# Patient Record
Sex: FEMALE | Race: WHITE | Employment: UNEMPLOYED | URBAN - METROPOLITAN AREA
[De-identification: names, ages, dates, MRNs, and addresses within clinical notes are randomized per-mention and may not be internally consistent; named-entity substitution may affect disease eponyms.]

---

## 2017-07-05 ENCOUNTER — TELEPHONE (OUTPATIENT)
Dept: INTERNAL MEDICINE CLINIC | Age: 62
End: 2017-07-05

## 2017-12-28 ENCOUNTER — TELEPHONE (OUTPATIENT)
Dept: INTERNAL MEDICINE CLINIC | Age: 62
End: 2017-12-28

## 2018-02-07 ENCOUNTER — OFFICE VISIT (OUTPATIENT)
Dept: INTERNAL MEDICINE CLINIC | Age: 63
End: 2018-02-07

## 2018-02-07 VITALS
WEIGHT: 90 LBS | SYSTOLIC BLOOD PRESSURE: 112 MMHG | HEART RATE: 64 BPM | DIASTOLIC BLOOD PRESSURE: 74 MMHG | HEIGHT: 64 IN | BODY MASS INDEX: 15.36 KG/M2

## 2018-02-07 DIAGNOSIS — R31.21 ASYMPTOMATIC MICROSCOPIC HEMATURIA: ICD-10-CM

## 2018-02-07 DIAGNOSIS — Z00.00 WELL ADULT EXAM: ICD-10-CM

## 2018-02-07 DIAGNOSIS — M81.0 AGE-RELATED OSTEOPOROSIS WITHOUT CURRENT PATHOLOGICAL FRACTURE: ICD-10-CM

## 2018-02-07 DIAGNOSIS — R05.8 COUGH WITH SPUTUM: Primary | ICD-10-CM

## 2018-02-07 DIAGNOSIS — Z00.00 WELL ADULT HEALTH CHECK: ICD-10-CM

## 2018-02-07 LAB
BILIRUBIN, POC: NORMAL
BLOOD URINE, POC: NORMAL
CLARITY, POC: CLEAR
COLOR, POC: NORMAL
GLUCOSE URINE, POC: NORMAL
KETONES, POC: NORMAL
LEUKOCYTE EST, POC: NORMAL
NITRITE, POC: NORMAL
PH, POC: 5
PROTEIN, POC: NORMAL
SPECIFIC GRAVITY, POC: 1.03
UROBILINOGEN, POC: 1

## 2018-02-07 PROCEDURE — 71046 X-RAY EXAM CHEST 2 VIEWS: CPT | Performed by: INTERNAL MEDICINE

## 2018-02-07 PROCEDURE — 99205 OFFICE O/P NEW HI 60 MIN: CPT | Performed by: INTERNAL MEDICINE

## 2018-02-07 PROCEDURE — 81002 URINALYSIS NONAUTO W/O SCOPE: CPT | Performed by: INTERNAL MEDICINE

## 2018-02-07 PROCEDURE — 93000 ELECTROCARDIOGRAM COMPLETE: CPT | Performed by: INTERNAL MEDICINE

## 2018-02-07 RX ORDER — AMOXICILLIN AND CLAVULANATE POTASSIUM 875; 125 MG/1; MG/1
1 TABLET, FILM COATED ORAL 2 TIMES DAILY
Qty: 20 TABLET | Refills: 0 | Status: SHIPPED | OUTPATIENT
Start: 2018-02-07 | End: 2018-02-17

## 2018-02-07 RX ORDER — MONTELUKAST SODIUM 10 MG/1
10 TABLET ORAL NIGHTLY
Qty: 30 TABLET | Refills: 0 | Status: SHIPPED | OUTPATIENT
Start: 2018-02-07 | End: 2018-03-05 | Stop reason: SDUPTHER

## 2018-02-07 RX ORDER — CITALOPRAM 20 MG/1
20 TABLET ORAL DAILY
COMMUNITY

## 2018-02-07 RX ORDER — TRAZODONE HYDROCHLORIDE 50 MG/1
50 TABLET ORAL NIGHTLY
COMMUNITY

## 2018-02-07 RX ORDER — TRAMADOL HYDROCHLORIDE 50 MG/1
50 TABLET ORAL EVERY 8 HOURS PRN
COMMUNITY

## 2018-02-07 RX ORDER — FLUTICASONE PROPIONATE 220 UG/1
1 AEROSOL, METERED RESPIRATORY (INHALATION) 2 TIMES DAILY
Qty: 1 INHALER | Refills: 5 | Status: SHIPPED | OUTPATIENT
Start: 2018-02-07

## 2018-02-07 RX ORDER — CHOLESTYRAMINE LIGHT 4 G/5.7G
4 POWDER, FOR SUSPENSION ORAL DAILY
COMMUNITY

## 2018-02-07 ASSESSMENT — PATIENT HEALTH QUESTIONNAIRE - PHQ9
1. LITTLE INTEREST OR PLEASURE IN DOING THINGS: 1
SUM OF ALL RESPONSES TO PHQ QUESTIONS 1-9: 2
SUM OF ALL RESPONSES TO PHQ9 QUESTIONS 1 & 2: 2
2. FEELING DOWN, DEPRESSED OR HOPELESS: 1

## 2018-02-07 NOTE — PROGRESS NOTES
mg QD   Flovent 220 BID MDI      F/u in 1 month  Plan to repeat UA then as well    Defer osteoporosis treatment until next visit until after seen by Dr. Anmol Majano    HM: UTD  Advised annual fluvax every Fall

## 2018-02-14 ENCOUNTER — HOSPITAL ENCOUNTER (OUTPATIENT)
Dept: OTHER | Age: 63
Discharge: HOME | End: 2018-02-14
Attending: INTERNAL MEDICINE | Admitting: INTERNAL MEDICINE

## 2018-03-05 DIAGNOSIS — R05.8 COUGH WITH SPUTUM: ICD-10-CM

## 2018-03-05 RX ORDER — MONTELUKAST SODIUM 10 MG/1
TABLET ORAL
Qty: 30 TABLET | Refills: 0 | Status: SHIPPED | OUTPATIENT
Start: 2018-03-05 | End: 2018-04-02 | Stop reason: SDUPTHER

## 2018-03-16 ENCOUNTER — HOSPITAL ENCOUNTER (OUTPATIENT)
Dept: OTHER | Age: 63
Discharge: HOME | End: 2018-03-16
Attending: INTERNAL MEDICINE | Admitting: INTERNAL MEDICINE

## 2018-03-16 ENCOUNTER — OFFICE VISIT (OUTPATIENT)
Dept: INTERNAL MEDICINE CLINIC | Age: 63
End: 2018-03-16

## 2018-03-16 VITALS
SYSTOLIC BLOOD PRESSURE: 114 MMHG | HEART RATE: 60 BPM | BODY MASS INDEX: 15.36 KG/M2 | DIASTOLIC BLOOD PRESSURE: 60 MMHG | HEIGHT: 64 IN | OXYGEN SATURATION: 96 % | WEIGHT: 90 LBS

## 2018-03-16 DIAGNOSIS — R05.9 COUGH: ICD-10-CM

## 2018-03-16 DIAGNOSIS — R05.9 COUGH: Primary | ICD-10-CM

## 2018-03-16 PROCEDURE — 99213 OFFICE O/P EST LOW 20 MIN: CPT | Performed by: INTERNAL MEDICINE

## 2018-03-16 RX ORDER — BENZONATATE 100 MG/1
100 CAPSULE ORAL 3 TIMES DAILY PRN
Qty: 40 CAPSULE | Refills: 1 | Status: SHIPPED | OUTPATIENT
Start: 2018-03-16 | End: 2018-03-23

## 2018-03-16 RX ORDER — METHYLPREDNISOLONE 4 MG/1
TABLET ORAL
Qty: 1 KIT | Refills: 0 | Status: SHIPPED | OUTPATIENT
Start: 2018-03-16 | End: 2018-03-22

## 2018-03-17 DIAGNOSIS — R93.89 ABNORMAL CHEST X-RAY: Primary | ICD-10-CM

## 2018-03-20 ENCOUNTER — TELEPHONE (OUTPATIENT)
Dept: INTERNAL MEDICINE CLINIC | Age: 63
End: 2018-03-20

## 2018-03-20 NOTE — TELEPHONE ENCOUNTER
----- Message from Felicia Magallanes MD sent at 3/17/2018 10:37 AM EDT -----  Get CT chest done, then come in to review findings    Felicia Magallanes

## 2018-03-24 ENCOUNTER — HOSPITAL ENCOUNTER (OUTPATIENT)
Dept: CT IMAGING | Age: 63
Discharge: HOME | End: 2018-03-24
Attending: INTERNAL MEDICINE | Admitting: INTERNAL MEDICINE

## 2018-03-24 DIAGNOSIS — R93.89 ABNORMAL CHEST X-RAY: ICD-10-CM

## 2018-03-24 DIAGNOSIS — R93.89 ABNORMAL FINDINGS ON DIAGNOSTIC IMAGING OF OTHER SPECIFIED BODY STRUCTURES: ICD-10-CM

## 2018-03-26 ENCOUNTER — OFFICE VISIT (OUTPATIENT)
Dept: INTERNAL MEDICINE CLINIC | Age: 63
End: 2018-03-26

## 2018-03-26 VITALS
WEIGHT: 88 LBS | HEIGHT: 64 IN | DIASTOLIC BLOOD PRESSURE: 60 MMHG | HEART RATE: 64 BPM | BODY MASS INDEX: 15.03 KG/M2 | SYSTOLIC BLOOD PRESSURE: 122 MMHG

## 2018-03-26 DIAGNOSIS — R05.9 COUGH: Primary | ICD-10-CM

## 2018-03-26 PROCEDURE — 99213 OFFICE O/P EST LOW 20 MIN: CPT | Performed by: INTERNAL MEDICINE

## 2018-03-26 RX ORDER — BENZONATATE 100 MG/1
100 CAPSULE ORAL 3 TIMES DAILY PRN
COMMUNITY

## 2018-03-26 NOTE — PROGRESS NOTES
Subjective:      Patient ID: Jeanine Everett is a 63 y.o. female. HPI-cough improved after medrol dosepack and Tessalon last visit  CXR showed possible infiltrate but CT chest was WNL, they felt initial concerns were from shadowing    Review of Systems-sees GYN for Mammogram and PAP    Current Outpatient Prescriptions on File Prior to Visit   Medication Sig Dispense Refill    cholestyramine light 4 g packet Take 4 g by mouth daily      traMADol (ULTRAM) 50 MG tablet Take 50 mg by mouth every 8 hours as needed for Pain.  traZODone (DESYREL) 50 MG tablet Take 50 mg by mouth nightly      citalopram (CELEXA) 20 MG tablet Take 20 mg by mouth daily      Glucosamine-Chondroitin (GLUCOSAMINE CHONDR COMPLEX PO) Take 1 tablet by mouth daily      vitamin D (CHOLECALCIFEROL) 1000 UNIT TABS tablet Take 1,000 Units by mouth daily      montelukast (SINGULAIR) 10 MG tablet TAKE ONE TABLET BY MOUTH ONCE NIGHTLY 30 tablet 0    fluticasone (FLOVENT HFA) 220 MCG/ACT inhaler Inhale 1 puff into the lungs 2 times daily 1 Inhaler 5     No current facility-administered medications on file prior to visit. Objective:   Physical Exam   Constitutional: She is oriented to person, place, and time. She appears well-developed and well-nourished. /60   Pulse 64   Ht 5' 4\" (1.626 m)   Wt 88 lb (39.9 kg)   BMI 15.11 kg/m²      Neck: No JVD present. No thyromegaly present. Cardiovascular: Normal rate, regular rhythm and normal heart sounds. Pulmonary/Chest: Effort normal and breath sounds normal.   Genitourinary:   Genitourinary Comments: deferred   Musculoskeletal: Normal range of motion. She exhibits no edema or tenderness. Neurological: She is alert and oriented to person, place, and time. She has normal reflexes. Psychiatric: She has a normal mood and affect. Her behavior is normal.   Nursing note and vitals reviewed.     CT chest done for f/u of CXR 3/16 which read as \"indeterminate masslike consolidation L lung, CT chest showed no intrapulmonary findings  4 mm nodlue, no f/u needed in low risk patient    Assessment:      Patient Active Problem List   Diagnosis    Gastritis    Asymptomatic microscopic hematuria    Age-related osteoporosis without current pathological fracture           Plan:      URI resolved AFTER COUGH  No further Rx warranted  Continue GYN f/u  Taper off Singulair and Flovent    Retiring from [employer] after 41 years    Jw Downing

## 2018-04-02 DIAGNOSIS — R05.8 COUGH WITH SPUTUM: ICD-10-CM

## 2018-04-02 RX ORDER — MONTELUKAST SODIUM 10 MG/1
TABLET ORAL
Qty: 30 TABLET | Refills: 0 | Status: SHIPPED | OUTPATIENT
Start: 2018-04-02

## 2024-01-10 ENCOUNTER — HOSPITAL ENCOUNTER (OUTPATIENT)
Age: 69
Setting detail: SPECIMEN
Discharge: HOME | End: 2024-01-10

## 2024-01-10 LAB
Lab: NORMAL
REPORT: NORMAL
THIS TEST SENT TO: NORMAL

## 2024-01-10 RX ORDER — METHOCARBAMOL 500 MG/1
500 TABLET, FILM COATED ORAL 3 TIMES DAILY PRN
COMMUNITY
Start: 2023-02-08

## 2024-01-10 RX ORDER — ROSUVASTATIN CALCIUM 20 MG/1
20 TABLET, COATED ORAL DAILY
COMMUNITY
Start: 2023-08-08 | End: 2024-08-07

## 2024-01-10 RX ORDER — PHENOL 1.4 %
2 AEROSOL, SPRAY (ML) MUCOUS MEMBRANE DAILY
COMMUNITY

## 2024-01-10 NOTE — PROGRESS NOTES
PATIENT ONLY WEIGHS 112 LBS. CALL TO DR PARK'S OFFICE TO CLARIFY ANCEF DOSE  HS    1/22 per office , yes they want the dose ordered=mp

## 2024-01-10 NOTE — PROGRESS NOTES
[facility] PRE-SURGICAL TESTING INSTRUCTIONS                      PRIOR TO PROCEDURE DATE:    1. PLEASE FOLLOW ANY INSTRUCTIONS GIVEN TO YOU PER YOUR SURGEON.      2. Arrange for someone to drive you home and be with you for the first 24 hours after discharge for your safety after your procedure for which you received sedation. Ensure it is someone we can share information with regarding your discharge.     NOTE: At this time ONLY 2 ADULTS may accompany you   One person ENCOURAGED to stay at hospital entire time if outpatient surgery      3. You must contact your surgeon for instructions IF:  You are taking any blood thinners, aspirin, anti-inflammatory or vitamins.  There is a change in your physical condition such as a cold, fever, rash, cuts, sores, or any other infection, especially near your surgical site.    4. Do not drink alcohol the day before or day of your procedure.  Do not use any recreational marijuana at least 24 hours or street drugs (heroin, cocaine) at minimum 5 days prior to your procedure.     5. A Pre-Surgical History and Physical MUST be completed WITHIN 30 DAYS OR LESS prior to your procedure.by your Physician or an Urgent Care        THE DAY OF YOUR PROCEDURE:  1.  Follow instructions for ARRIVAL TIME as DIRECTED BY YOUR SURGEON.     2. Enter the MAIN entrance from [facility] and follow the signs to the free Parking Garage or  Parking (offered free of charge 7 am-5pm).      3. Enter the Main Entrance of the hospital (do not enter from the lower level of the parking garage). Upon entrance, check in with the  at the surgical information desk on your LEFT.   Bring your insurance card and photo ID to register      4. DO NOT EAT ANYTHING 8 hours prior to arrival for surgery.  You may have up to 8 ounces of water 4 hours prior to your arrival for surgery.   NOTE: ALL Gastric, Bariatric & Bowel surgery patients - you MUST follow your surgeon's instructions regarding

## 2024-01-17 ENCOUNTER — HOSPITAL ENCOUNTER (OUTPATIENT)
Dept: ULTRASOUND IMAGING | Age: 69
Discharge: HOME | End: 2024-01-17
Payer: MEDICARE

## 2024-01-17 ENCOUNTER — HOSPITAL ENCOUNTER (OUTPATIENT)
Dept: MAMMOGRAPHY | Age: 69
Discharge: HOME | End: 2024-01-17
Payer: MEDICARE

## 2024-01-17 DIAGNOSIS — N63.20 MASS OF LEFT BREAST, UNSPECIFIED QUADRANT: ICD-10-CM

## 2024-01-17 PROCEDURE — 19285 PERQ DEV BREAST 1ST US IMAG: CPT

## 2024-01-17 PROCEDURE — 77065 DX MAMMO INCL CAD UNI: CPT

## 2024-01-18 ENCOUNTER — HOSPITAL ENCOUNTER (OUTPATIENT)
Dept: MAMMOGRAPHY | Age: 69
Discharge: HOME | End: 2024-01-18
Payer: MEDICARE

## 2024-01-18 ENCOUNTER — ANESTHESIA EVENT (OUTPATIENT)
Dept: OPERATING ROOM | Age: 69
End: 2024-01-18
Payer: MEDICARE

## 2024-01-18 ENCOUNTER — ANESTHESIA (OUTPATIENT)
Dept: OPERATING ROOM | Age: 69
End: 2024-01-18
Payer: MEDICARE

## 2024-01-18 ENCOUNTER — HOSPITAL ENCOUNTER (OUTPATIENT)
Age: 69
Setting detail: OUTPATIENT SURGERY
Discharge: HOME | End: 2024-01-18
Attending: SURGERY | Admitting: SURGERY
Payer: MEDICARE

## 2024-01-18 VITALS
HEART RATE: 68 BPM | SYSTOLIC BLOOD PRESSURE: 106 MMHG | BODY MASS INDEX: 18.49 KG/M2 | RESPIRATION RATE: 14 BRPM | DIASTOLIC BLOOD PRESSURE: 66 MMHG | TEMPERATURE: 97 F | HEIGHT: 65 IN | WEIGHT: 111 LBS | OXYGEN SATURATION: 96 %

## 2024-01-18 DIAGNOSIS — R92.8 ABNORMAL MAMMOGRAM: ICD-10-CM

## 2024-01-18 DIAGNOSIS — R92.8 ABNORMAL MAMMOGRAM OF LEFT BREAST: ICD-10-CM

## 2024-01-18 PROCEDURE — 7100000011 HC PHASE II RECOVERY - ADDTL 15 MIN: Performed by: SURGERY

## 2024-01-18 PROCEDURE — 6360000002 HC RX W HCPCS: Performed by: SURGERY

## 2024-01-18 PROCEDURE — 76098 X-RAY EXAM SURGICAL SPECIMEN: CPT

## 2024-01-18 PROCEDURE — 6370000000 HC RX 637 (ALT 250 FOR IP): Performed by: ANESTHESIOLOGY

## 2024-01-18 PROCEDURE — 7100000001 HC PACU RECOVERY - ADDTL 15 MIN: Performed by: SURGERY

## 2024-01-18 PROCEDURE — 88305 TISSUE EXAM BY PATHOLOGIST: CPT

## 2024-01-18 PROCEDURE — 2580000003 HC RX 258: Performed by: SURGERY

## 2024-01-18 PROCEDURE — 3700000001 HC ADD 15 MINUTES (ANESTHESIA): Performed by: SURGERY

## 2024-01-18 PROCEDURE — 2580000003 HC RX 258: Performed by: ANESTHESIOLOGY

## 2024-01-18 PROCEDURE — 7100000010 HC PHASE II RECOVERY - FIRST 15 MIN: Performed by: SURGERY

## 2024-01-18 PROCEDURE — 2709999900 HC NON-CHARGEABLE SUPPLY: Performed by: SURGERY

## 2024-01-18 PROCEDURE — 6360000002 HC RX W HCPCS: Performed by: NURSE ANESTHETIST, CERTIFIED REGISTERED

## 2024-01-18 PROCEDURE — 88342 IMHCHEM/IMCYTCHM 1ST ANTB: CPT

## 2024-01-18 PROCEDURE — 7100000000 HC PACU RECOVERY - FIRST 15 MIN: Performed by: SURGERY

## 2024-01-18 PROCEDURE — 2720000010 HC SURG SUPPLY STERILE: Performed by: SURGERY

## 2024-01-18 PROCEDURE — 3700000000 HC ANESTHESIA ATTENDED CARE: Performed by: SURGERY

## 2024-01-18 PROCEDURE — 88341 IMHCHEM/IMCYTCHM EA ADD ANTB: CPT

## 2024-01-18 PROCEDURE — 3600000014 HC SURGERY LEVEL 4 ADDTL 15MIN: Performed by: SURGERY

## 2024-01-18 PROCEDURE — 3600000004 HC SURGERY LEVEL 4 BASE: Performed by: SURGERY

## 2024-01-18 RX ORDER — METOCLOPRAMIDE HYDROCHLORIDE 5 MG/ML
10 INJECTION INTRAMUSCULAR; INTRAVENOUS
Status: DISCONTINUED | OUTPATIENT
Start: 2024-01-18 | End: 2024-01-18 | Stop reason: HOSPADM

## 2024-01-18 RX ORDER — DIPHENHYDRAMINE HYDROCHLORIDE 50 MG/ML
12.5 INJECTION INTRAMUSCULAR; INTRAVENOUS
Status: DISCONTINUED | OUTPATIENT
Start: 2024-01-18 | End: 2024-01-18 | Stop reason: HOSPADM

## 2024-01-18 RX ORDER — HYDROMORPHONE HYDROCHLORIDE 1 MG/ML
0.5 INJECTION, SOLUTION INTRAMUSCULAR; INTRAVENOUS; SUBCUTANEOUS EVERY 10 MIN PRN
Status: DISCONTINUED | OUTPATIENT
Start: 2024-01-18 | End: 2024-01-18 | Stop reason: HOSPADM

## 2024-01-18 RX ORDER — SODIUM CHLORIDE 0.9 % (FLUSH) 0.9 %
5-40 SYRINGE (ML) INJECTION PRN
Status: DISCONTINUED | OUTPATIENT
Start: 2024-01-18 | End: 2024-01-18 | Stop reason: HOSPADM

## 2024-01-18 RX ORDER — LABETALOL HYDROCHLORIDE 5 MG/ML
10 INJECTION, SOLUTION INTRAVENOUS
Status: DISCONTINUED | OUTPATIENT
Start: 2024-01-18 | End: 2024-01-18 | Stop reason: HOSPADM

## 2024-01-18 RX ORDER — BUPIVACAINE HYDROCHLORIDE 5 MG/ML
INJECTION, SOLUTION EPIDURAL; INTRACAUDAL PRN
Status: DISCONTINUED | OUTPATIENT
Start: 2024-01-18 | End: 2024-01-18 | Stop reason: HOSPADM

## 2024-01-18 RX ORDER — ONDANSETRON 2 MG/ML
INJECTION INTRAMUSCULAR; INTRAVENOUS PRN
Status: DISCONTINUED | OUTPATIENT
Start: 2024-01-18 | End: 2024-01-18 | Stop reason: SDUPTHER

## 2024-01-18 RX ORDER — SODIUM CHLORIDE, SODIUM LACTATE, POTASSIUM CHLORIDE, CALCIUM CHLORIDE 600; 310; 30; 20 MG/100ML; MG/100ML; MG/100ML; MG/100ML
INJECTION, SOLUTION INTRAVENOUS CONTINUOUS
Status: DISCONTINUED | OUTPATIENT
Start: 2024-01-18 | End: 2024-01-18 | Stop reason: HOSPADM

## 2024-01-18 RX ORDER — HYDRALAZINE HYDROCHLORIDE 20 MG/ML
10 INJECTION INTRAMUSCULAR; INTRAVENOUS
Status: DISCONTINUED | OUTPATIENT
Start: 2024-01-18 | End: 2024-01-18 | Stop reason: HOSPADM

## 2024-01-18 RX ORDER — GLYCOPYRROLATE 0.2 MG/ML
INJECTION INTRAMUSCULAR; INTRAVENOUS PRN
Status: DISCONTINUED | OUTPATIENT
Start: 2024-01-18 | End: 2024-01-18 | Stop reason: SDUPTHER

## 2024-01-18 RX ORDER — MEPERIDINE HYDROCHLORIDE 25 MG/ML
12.5 INJECTION INTRAMUSCULAR; INTRAVENOUS; SUBCUTANEOUS EVERY 5 MIN PRN
Status: DISCONTINUED | OUTPATIENT
Start: 2024-01-18 | End: 2024-01-18 | Stop reason: HOSPADM

## 2024-01-18 RX ORDER — LIDOCAINE HYDROCHLORIDE 20 MG/ML
INJECTION, SOLUTION INTRAVENOUS PRN
Status: DISCONTINUED | OUTPATIENT
Start: 2024-01-18 | End: 2024-01-18 | Stop reason: SDUPTHER

## 2024-01-18 RX ORDER — PROPOFOL 10 MG/ML
INJECTION, EMULSION INTRAVENOUS PRN
Status: DISCONTINUED | OUTPATIENT
Start: 2024-01-18 | End: 2024-01-18 | Stop reason: SDUPTHER

## 2024-01-18 RX ORDER — ACETAMINOPHEN 325 MG/1
650 TABLET ORAL ONCE
Status: COMPLETED | OUTPATIENT
Start: 2024-01-18 | End: 2024-01-18

## 2024-01-18 RX ORDER — PHENYLEPHRINE HYDROCHLORIDE 10 MG/ML
INJECTION INTRAVENOUS PRN
Status: DISCONTINUED | OUTPATIENT
Start: 2024-01-18 | End: 2024-01-18 | Stop reason: SDUPTHER

## 2024-01-18 RX ORDER — FENTANYL CITRATE 50 UG/ML
INJECTION, SOLUTION INTRAMUSCULAR; INTRAVENOUS PRN
Status: DISCONTINUED | OUTPATIENT
Start: 2024-01-18 | End: 2024-01-18 | Stop reason: SDUPTHER

## 2024-01-18 RX ORDER — SODIUM CHLORIDE 0.9 % (FLUSH) 0.9 %
5-40 SYRINGE (ML) INJECTION EVERY 12 HOURS SCHEDULED
Status: DISCONTINUED | OUTPATIENT
Start: 2024-01-18 | End: 2024-01-18 | Stop reason: HOSPADM

## 2024-01-18 RX ORDER — DEXAMETHASONE SODIUM PHOSPHATE 4 MG/ML
INJECTION, SOLUTION INTRA-ARTICULAR; INTRALESIONAL; INTRAMUSCULAR; INTRAVENOUS; SOFT TISSUE PRN
Status: DISCONTINUED | OUTPATIENT
Start: 2024-01-18 | End: 2024-01-18 | Stop reason: SDUPTHER

## 2024-01-18 RX ORDER — SODIUM CHLORIDE 9 MG/ML
INJECTION, SOLUTION INTRAVENOUS PRN
Status: DISCONTINUED | OUTPATIENT
Start: 2024-01-18 | End: 2024-01-18 | Stop reason: HOSPADM

## 2024-01-18 RX ORDER — HYDROMORPHONE HYDROCHLORIDE 1 MG/ML
0.5 INJECTION, SOLUTION INTRAMUSCULAR; INTRAVENOUS; SUBCUTANEOUS EVERY 5 MIN PRN
Status: DISCONTINUED | OUTPATIENT
Start: 2024-01-18 | End: 2024-01-18 | Stop reason: HOSPADM

## 2024-01-18 RX ADMIN — DEXAMETHASONE SODIUM PHOSPHATE 8 MG: 4 INJECTION, SOLUTION INTRAMUSCULAR; INTRAVENOUS at 11:25

## 2024-01-18 RX ADMIN — ONDANSETRON 4 MG: 2 INJECTION INTRAMUSCULAR; INTRAVENOUS at 12:16

## 2024-01-18 RX ADMIN — PHENYLEPHRINE HYDROCHLORIDE 100 MCG: 10 INJECTION INTRAVENOUS at 12:13

## 2024-01-18 RX ADMIN — PHENYLEPHRINE HYDROCHLORIDE 100 MCG: 10 INJECTION INTRAVENOUS at 11:26

## 2024-01-18 RX ADMIN — SODIUM CHLORIDE 2000 MG: 900 INJECTION INTRAVENOUS at 11:22

## 2024-01-18 RX ADMIN — PHENYLEPHRINE HYDROCHLORIDE 100 MCG: 10 INJECTION INTRAVENOUS at 11:41

## 2024-01-18 RX ADMIN — FENTANYL CITRATE 100 MCG: 50 INJECTION, SOLUTION INTRAMUSCULAR; INTRAVENOUS at 11:27

## 2024-01-18 RX ADMIN — LIDOCAINE HYDROCHLORIDE 100 MG: 20 INJECTION, SOLUTION INTRAVENOUS at 11:12

## 2024-01-18 RX ADMIN — PROPOFOL 150 MG: 10 INJECTION, EMULSION INTRAVENOUS at 11:12

## 2024-01-18 RX ADMIN — PHENYLEPHRINE HYDROCHLORIDE 200 MCG: 10 INJECTION INTRAVENOUS at 11:29

## 2024-01-18 RX ADMIN — PHENYLEPHRINE HYDROCHLORIDE 100 MCG: 10 INJECTION INTRAVENOUS at 11:57

## 2024-01-18 RX ADMIN — PHENYLEPHRINE HYDROCHLORIDE 100 MCG: 10 INJECTION INTRAVENOUS at 12:01

## 2024-01-18 RX ADMIN — GLYCOPYRROLATE 0.2 MG: 0.2 INJECTION INTRAMUSCULAR; INTRAVENOUS at 11:24

## 2024-01-18 RX ADMIN — SODIUM CHLORIDE, SODIUM LACTATE, POTASSIUM CHLORIDE, AND CALCIUM CHLORIDE: .6; .31; .03; .02 INJECTION, SOLUTION INTRAVENOUS at 11:07

## 2024-01-18 RX ADMIN — PHENYLEPHRINE HYDROCHLORIDE 100 MCG: 10 INJECTION INTRAVENOUS at 11:18

## 2024-01-18 RX ADMIN — PHENYLEPHRINE HYDROCHLORIDE 200 MCG: 10 INJECTION INTRAVENOUS at 11:21

## 2024-01-18 RX ADMIN — ACETAMINOPHEN 650 MG: 325 TABLET ORAL at 10:05

## 2024-01-18 ASSESSMENT — PAIN DESCRIPTION - LOCATION
LOCATION: HEAD
LOCATION: HEAD

## 2024-01-18 ASSESSMENT — PAIN SCALES - GENERAL
PAINLEVEL_OUTOF10: 8
PAINLEVEL_OUTOF10: 4

## 2024-01-18 NOTE — PROGRESS NOTES
Pt tolerates PO tylenol well for migraine. Nausea subsided and rates headache earlier a 4/10. CRNA is made aware of the patients torn right rotator cuff. Be careful in moving patients right shoulder.

## 2024-01-18 NOTE — PROGRESS NOTES
PACU Transfer to [facility]    Vitals:    01/18/24 1315   BP: 118/65   Pulse: 61   Resp: 13   Temp: 97.1 °F (36.2 °C)   SpO2: 95%     Patient denies nausea.    Intake/Output Summary (Last 24 hours) at 1/18/2024 1324  Last data filed at 1/18/2024 1322  Gross per 24 hour   Intake 1070 ml   Output 0 ml   Net 1070 ml       Pain assessment:  none      Patient transferred to care of [facility] RN. Patient transported by stretcher to [facility] #6 by Leslie.    1/18/2024 1:24 PM

## 2024-01-18 NOTE — DISCHARGE INSTRUCTIONS
contact us  Please call the surgical office immediately if you notice any of the following: Excessive pain, persistent fever, excessive bleeding or swelling, redness surrounding the wound, drainage from the wound, reactions to medications, or any general concerns or worsening of overall condition.  The breast surgery office can be reached at [phone] ([phone]).     [facility] AMBULATORY PROCEDURE DISCHARGE INSTRUCTIONS    There are potential side effects of anesthesia or sedation you may experience for the first 24 hours.  These side effects include:    Confusion or Memory loss, Dizziness, or Delayed Reaction Times   [x]A responsible person should be with you for the next 24 hours.  Do not operate any vehicles (automobiles, bicycles, motorcycles) or power tools or machinery for 24 hours.  Do not sign any legal documents or make any legal decisions for 24 hours. Do not drink alcohol for 24 hours or while taking narcotic pain medication.      Nausea    [x]Start with light diet and progress to your normal diet as you feel like eating. However, if you experience nausea or repeated episodes of vomiting which persist beyond 12-24 hours, notify your physician.  Once nausea has passed, remember to keep drinking fluids.    Difficulty Passing Urine  [x]Drink extra amounts of fluid today.  Notify your physician if you have not urinated within 8 hours after your procedure or you feel uncomfortable.      Irritated Throat from a Breathing Tube  [x]Drink extra amounts of fluid today.  Lozenges may help.    Muscle Aches  [x]You may experience some generalized body aches as your muscles recover from medications used to relax them during surgery.  These will gradually subside.    MEDICATION INSTRUCTIONS:  []Prescription(S) x   0  sent with you.  Use as directed.  When taking pain medications, you may experience the side effect of dizziness or drowsiness.  Do not drink alcohol or drive when taking these  medications.  []Prescription(S) x      0    Called to Pharmacy Name and location:    [x]Give the list of your medications to your primary care physician on your next visit. Keep your med list updated and carry it with in case of emergencies.    [x] Narcotic pain medications can cause the side effect of significant constipation.  You may want to add a stool softener to your postoperative medication schedule or speak to your surgeon on how best to manage this side effect.    NARCOTIC SAFETY:  Your pain medicine is only for you to take.  Safely store your medicines.  Store pills up high and out of reach of children and pets.  Ensure safety caps are snapped tightly  Keep track of how many pills you have left    Unused medication can be disposed of by taking them to a drop-off box or take-back program that is authorized by the [facility].  Access to a site near you can be found on the [employer] website ([employer]com.gov).    If you have a CPAP machine, it is very important that you use it daily during all periods of sleep and daytime rest during your recovery at home.  Surgery and Anesthesia place a significant amount of stress on your body.  Using your CPAP will help keep you safe and lessen the negative effects of that stress.    FOLLOW-UP RECOVERY CARE:  [x]Call the office at [phone] ([phone]). for follow-up appointment and problems    Watch for these possible complications, symptoms, or side effects of anesthesia.  Call physician if they or any other problems occur:  Signs of INFECTION   > Fever over 101°     > Redness, swelling, hardness or warmth at the operative site   >Foul smelling or cloudy drainage at the operative site   Unrelieved PAIN  Unrelieved NAUSEA  Blood soaked dressing.  (Some oozing may be normal)  Inability to urinate      Numb, pale, blue, cold or tingling extremity      Physician:  Dr. Donnelly    The above instructions were reviewed with patient/significant other.  The

## 2024-01-18 NOTE — PROGRESS NOTES
Patient brought to PACU #5 from the OR. Patient placed on monitor. Report received from RN and CRNA. VSS. Denies pain at this time.

## 2024-01-18 NOTE — OP NOTE
Operative Note    Veronica Cha  YOB: 1955  MRN: [id number]    PREOPERATIVE DIAGNOSIS  left abnormal mammogram     POSTOPERATIVE DIAGNOSIS  left abnormal mammogram    PROCEDURE  left radiofrequency identification tag localized excisional breast biopsy    ANESTHESIA  General anesthesia    SURGEON  Teresa Donnelly MD     ASSISTANT  Resident: Uri Menendez DO    ESTIMATED BLOOD LOSS  less than 50 ml    COMPLICATIONS  None apparent by completion of procedure    SPECIMENS REMOVED  1. The left breast tissue which was marked with a marked with a short stitch on the superior margin and a long stitch on the lateral margin.    FINDING  The left breast tissue was excised using radiofrequency identification tag localization.  Specimen radiograph demonstrated that the lesion and clip and tag were located within the specimen.    POST-OP CONDITION  Stable    DISPOSITION  To recovery room    INDICATION FOR PROCEDURE  Veronica Cha is a 68 y.o. woman who was found to have an abnormality in her left breast on imaging.  A core biopsy was performed and revealed atypia.  Due to the upgrade risk for malignancy, I did recommend excision.  She presents today for that purpose. The indications for the planned procedure, along with the potential benefits and risks which include but are not limited to: anesthetic risk, bleeding, infection, wound complications, sensation changes, unappealing cosmetics, and the need to return to the operating room for a second procedure based on final pathology were reviewed. All questions were answered, and she agrees to proceed.    DESCRIPTION OF PROCEDURE   Veronica Cha underwent an image guided localization procedure preoperatively in the radiology department.  She was then brought to the operating room and placed supine on the operating room table with her arms extended on boards. She was appropriately positioned and padded. Bilateral sequential compression devices  were applied to both lower extremities and a single dose of antibiotics was administered intravenously within 60 minutes prior to the incision time.  Breast imaging was available in the room.  After induction of anesthesia, a timeout procedure was performed.  The patient was prepped and draped in the standard surgical fashion.      We directed our attention to the left breast.  A periareolar incision was planned based on the location of the greatest radiofrequency identification tag signal.  This was anterior to the signal.  The incision was made and carried down through the dermis with electrocautery.  No skin was removed.  The radiofrequency identification tag signal was then used to create flaps and excise tissue anterior, superior, inferior, medial, and lateral to the signal.  The tissue was then transected from the posterior attachments.  Dissection was not carried to the chest wall.  The specimen was then marked with a short stitch on the superior margin and a long stitch on the lateral margin and a  specimen radiograph was obtained.  Specimen radiograph demonstrated that the lesion and clip and tag were located within the specimen.  No additional margins were obtained.  Attention was then turned to the wound.  Aggressive hemostasis was obtained with electrocautery.  The wound was irrigated with copious amounts of sterile saline.  0.5% bupivacaine was infiltrated into the soft tissues surrounding the cavity and hemostasis was again confirmed.  The deep dermal layer was then reapproximated with interrupted 3-0 Vicryl stitches.  The skin was reapproximated with a running subcuticular using 4-0 Monocryl.  Surgical glue dressing was applied.  The patient tolerated this procedure well, was awakened and transferred to the recovery room. The instrument, sponge, and needle counts were correct.  Her family was notified of intraoperative findings.    Electronically signed by Teresa Donnelly MD on 1/18/24 at

## 2024-01-18 NOTE — PROGRESS NOTES
Ambulatory Surgery/Procedure Discharge Note    Vitals:    01/18/24 1330   BP: 106/66   Pulse: 68   Resp: 14   Temp: 97 °F (36.1 °C)   SpO2: 96%       In: 1070 [P.O.:120; I.V.:900]  Out: 0     Restroom use offered before discharge.  Yes  Pt is toileted and remains safe. Tolerates PO well and denies nausea. Discharge instructions were read at bedside and the follow up appointment to be arranged.    Pain assessment:  present - adequately treated  Pain Level: 4        Patient discharged to home/self care. Patient discharged via wheel chair by transporter to waiting family/S.O.       1/18/2024 2:06 PM

## 2024-01-18 NOTE — ANESTHESIA POSTPROCEDURE EVALUATION
Department of Anesthesiology  Postprocedure Note    Patient: Veronica Cha  MRN: [id number]  YOB: 1955  Date of evaluation: 1/18/2024    Procedure Summary       Date: 01/18/24 Room / Location: [address]    Anesthesia Start: 1107 Anesthesia Stop: 1231    Procedures:       LEFT RADIOFREQUENCY IDENTIFICATION TAG LOCALIZED EXCISIONAL BREAST BIOPSY (Left)      . (Left) Diagnosis:       Abnormal mammogram of left breast      (Abnormal mammogram of left breast [R92.8])    Surgeons: Teresa Donnelly MD Responsible Provider: Regulo Campbell MD    Anesthesia Type: general ASA Status: 2            Anesthesia Type: No value filed.    Cheikh Phase I: Cheikh Score: 10    Cheikh Phase II: Cheikh Score: 10    Anesthesia Post Evaluation    Patient location during evaluation: PACU  Patient participation: complete - patient participated  Level of consciousness: awake  Airway patency: patent  Nausea & Vomiting: no nausea and no vomiting  Cardiovascular status: blood pressure returned to baseline and hemodynamically stable  Respiratory status: acceptable  Hydration status: euvolemic  Multimodal analgesia pain management approach  Pain management: adequate    No notable events documented.

## 2024-12-11 ENCOUNTER — HOSPITAL ENCOUNTER (OUTPATIENT)
Dept: MAMMOGRAPHY | Age: 69
Discharge: HOME | End: 2024-12-11
Payer: MEDICARE

## 2024-12-11 ENCOUNTER — APPOINTMENT (OUTPATIENT)
Dept: ULTRASOUND IMAGING | Age: 69
End: 2024-12-11
Payer: MEDICARE

## 2024-12-11 DIAGNOSIS — Z12.39 ENCOUNTER FOR SCREENING FOR MALIGNANT NEOPLASM OF BREAST, UNSPECIFIED SCREENING MODALITY: ICD-10-CM

## 2024-12-11 DIAGNOSIS — C50.812 MALIGNANT NEOPLASM OF OVERLAPPING SITES OF LEFT FEMALE BREAST, UNSPECIFIED ESTROGEN RECEPTOR STATUS (HCC): ICD-10-CM

## 2024-12-11 PROCEDURE — G0279 TOMOSYNTHESIS, MAMMO: HCPCS

## (undated) DEVICE — DRAPE,LAPAROTOMY,PED,STERILE: Brand: MEDLINE

## (undated) DEVICE — SUTURE PERMA-HAND SZ 2-0 L30IN NONABSORBABLE BLK L26MM SH K833H

## (undated) DEVICE — SUTURE MCRYL + SZ 4-0 L27IN ABSRB UD L19MM PS-2 3/8 CIR MCP426H

## (undated) DEVICE — GLOVE SURG SZ 7 CRM LTX FREE POLYISOPRENE POLYMER BEAD ANTI

## (undated) DEVICE — LIQUIBAND RAPID ADHESIVE 36/CS 0.8ML: Brand: MEDLINE

## (undated) DEVICE — GAUZE FLUFF 1 PLY: Brand: DEROYAL

## (undated) DEVICE — SHEET,DRAPE,40X58,STERILE: Brand: MEDLINE

## (undated) DEVICE — INTENDED USE FOR SURGICAL MARKING ON INTACT SKIN, ALSO PROVIDES A PERMANENT METHOD OF IDENTIFYING OBJECTS IN THE OPERATING ROOM: Brand: WRITESITE® PLUS MINI PREP RESISTANT MARKER

## (undated) DEVICE — GLOVE SURG SZ 6.5 BRN LTX FRE SYNTH POLYCHLOROPRENE POLYMER

## (undated) DEVICE — GOWN,SIRUS,POLYRNF,BRTHSLV,LG,30/CS: Brand: MEDLINE

## (undated) DEVICE — MINOR BREAST: Brand: MEDLINE INDUSTRIES, INC.

## (undated) DEVICE — DRAPE,UTILTY,TAPE,15X26, 4EA/PK: Brand: MEDLINE

## (undated) DEVICE — STANDARD HYPODERMIC NEEDLE,POLYPROPYLENE HUB: Brand: MONOJECT

## (undated) DEVICE — PROBE LOCALIZER W/DRAPE

## (undated) DEVICE — YANKAUER,OPEN TIP,W/O VENT,STERILE: Brand: MEDLINE INDUSTRIES, INC.

## (undated) DEVICE — PROBE SET W/DRAPE

## (undated) DEVICE — APPLICATOR MEDICATED 26 CC SOLUTION HI LT ORNG CHLORAPREP

## (undated) DEVICE — SUTURE VCRL + SZ 3-0 L27IN ABSRB UD L26MM SH 1/2 CIR VCP416H

## (undated) DEVICE — TOWEL,STOP FLAG GOLD N-W: Brand: MEDLINE

## (undated) DEVICE — GLOVE SURG SZ 65 THK91MIL LTX FREE SYN POLYISOPRENE